# Patient Record
Sex: FEMALE | Race: WHITE
[De-identification: names, ages, dates, MRNs, and addresses within clinical notes are randomized per-mention and may not be internally consistent; named-entity substitution may affect disease eponyms.]

---

## 2020-08-16 ENCOUNTER — HOSPITAL ENCOUNTER (EMERGENCY)
Dept: HOSPITAL 11 - JP.ED | Age: 60
Discharge: HOME | End: 2020-08-16
Payer: COMMERCIAL

## 2020-08-16 DIAGNOSIS — Z79.899: ICD-10-CM

## 2020-08-16 DIAGNOSIS — S00.05XA: Primary | ICD-10-CM

## 2020-08-16 DIAGNOSIS — Z88.1: ICD-10-CM

## 2020-08-16 DIAGNOSIS — Z88.5: ICD-10-CM

## 2020-08-16 DIAGNOSIS — Z88.8: ICD-10-CM
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
## 2020-08-16 PROCEDURE — 99283 EMERGENCY DEPT VISIT LOW MDM: CPT

## 2020-08-16 NOTE — EDM.PDOC
ED HPI GENERAL MEDICAL PROBLEM





- General


Chief Complaint: Skin Complaint


Stated Complaint: FISH HOOK IN R EAR


Time Seen by Provider: 20 13:12


Source of Information: Reports: Patient


History Limitations: Reports: No Limitations





- History of Present Illness


INITIAL COMMENTS - FREE TEXT/NARRATIVE: 





61 yo female presents with fish hook in her right ear.  up to date on tetanus


  ** Right Ear


Pain Score (Numeric/FACES): 5





- Related Data


                                    Allergies











Allergy/AdvReac Type Severity Reaction Status Date / Time


 


celecoxib [From Celebrex] Allergy  Hives Verified 20 13:10


 


morphine Allergy  Headache Verified 20 13:10


 


propoxyphene [From Darvon] Allergy  Rash Verified 20 13:10











Home Meds: 


                                    Home Meds





Cetirizine [ZyrTEC] 10 mg PO DAILY 20 [History]


Fluticasone Propionate [Flonase Allergy Relief] 1 dose TOP BID 20 

[History]


Nitrofurantoin Monohyd/M-Cryst [Macrobid 100 mg Capsule] 100 mg PO BEDTIME 

20 [History]


diphenhydrAMINE [Benadryl] 25 mg PO BEDTIME PRN 20 [History]











Past Medical History


HEENT History: Reports: Impaired Vision


Genitourinary History: Reports: UTI, Recurrent


OB/GYN History: Reports: Pregnancy, Spontaneous 


Musculoskeletal History: Reports: Fracture


Other Musculoskeletal History: fingers


Neurological History: Reports: Concussion





- Infectious Disease History


Infectious Disease History: Reports: Chicken Pox, Measles, Mumps





- Past Surgical History


HEENT Surgical History: Reports: Adenoidectomy, Tonsillectomy





Social & Family History





- Tobacco Use


Smoking Status *Q: Never Smoker


Second Hand Smoke Exposure: No





- Caffeine Use


Caffeine Use: Reports: None





- Alcohol Use


Days Per Week of Alcohol Use: 1


Number of Drinks Per Day: 1


Total Drinks Per Week: 1





- Recreational Drug Use


Recreational Drug Use: No





ED ROS GENERAL





- Review of Systems


Review Of Systems: See Below


Constitutional: Denies: Fever, Chills, Fatigue


Respiratory: Denies: Shortness of Breath, Wheezing


Cardiovascular: Denies: Chest Pain





ED EXAM, SKIN/RASH


Exam: See Below


Text/Narrative:: 





exam limited to right ear


Exam Limited By: No Limitations


General Appearance: Alert, WD/WN, No Apparent Distress


Skin: Other (fish hook in skin of posterior right ear)





Course





- Vital Signs


Last Recorded V/S: 


                                Last Vital Signs











Temp  36.1 C   20 13:20


 


Pulse  74   20 13:20


 


Resp  14   20 13:20


 


BP  146/94 H  20 13:20


 


Pulse Ox  98   20 13:20














- Orders/Labs/Meds


Meds: 


Medications














Discontinued Medications














Generic Name Dose Route Start Last Admin





  Trade Name [place]  PRN Reason Stop Dose Admin


 


Bacitracin  1 dose  20 13:28  20 13:43





  Bacitracin Oint 1 Gm  TOP  20 13:29  1 dose





  ONETIME ONE   Administration


 


Lidocaine HCl  5 ml  20 13:27  20 13:43





  Xylocaine-Mpf 1%  INJECT  20 13:28  5 ml





  ONETIME ONE   Administration














- Re-Assessments/Exams


Free Text/Narrative Re-Assessment/Exam: 





20 13:45


procedure: 1% lidocaine infiltrated into area surrounding hook.  hook removed 

with pressure counter pressure without difficulty 





Departure





- Departure


Time of Disposition: 13:42


Disposition: Home, Self-Care 01


Condition: Good


Clinical Impression: 


Fish hook injury of scalp


Qualifiers:


 Encounter type: initial encounter Qualified Code(s): S09.90XA - Unspecified 

injury of head, initial encounter








- Discharge Information


*PRESCRIPTION DRUG MONITORING PROGRAM REVIEWED*: Not Applicable


*COPY OF PRESCRIPTION DRUG MONITORING REPORT IN PATIENT GRACIELA: Not Applicable


Instructions:  Puncture Wound, Easy-to-Read


Referrals: 


PCP,None [Primary Care Provider] - 


Forms:  ED Department Discharge


Additional Instructions: 


wash with warm soapy water


ice for swelling control








Sepsis Event Note (ED)





- Evaluation


Sepsis Screening Result: No Definite Risk





- Focused Exam


Vital Signs: 


                                   Vital Signs











  Temp Pulse Resp BP Pulse Ox


 


 20 13:20  36.1 C  74  14  146/94 H  98


 


 20 13:06  36.1 C  74  14  146/94 H  98